# Patient Record
(demographics unavailable — no encounter records)

---

## 2025-04-30 NOTE — ASSESSMENT
[FreeTextEntry1] : We had a thorough discussion regarding her condition and findings.  She is aware that her recent CTA did not in fact reveal an aneurysm but a very small vascular calcification.  Her recent symptoms are felt to be related to malignant hypertension and strict blood pressure control has been recommended.  I have encouraged her to continue to follow-up with her cardiologist.  She should be monitoring her blood pressure daily.  We have also had a thorough discussion regarding lifestyle modifications including but not limited to smoking cessation, dietary changes, weight reduction, and including daily exercise into her weekly routine.  At this point there is no neurosurgical intervention.  She may follow-up with me as needed. Will call barring any issues. All questions answered today.   Kyra Seay MS PA-C Senior Physician Assistant [facility]   Landen Combs MD [facility] Director, Cerebrovascular & Endovascular Neurosurgery [facility]

## 2025-04-30 NOTE — HISTORY OF PRESENT ILLNESS
[de-identified] : Ms. MCINTYRE is a 52-year-old right-handed female who has a past medical history of hypertension and migraine headaches.  On 4/8/25 she developed acute left sided facial numbness, generalized weakness with headaches, dizziness, and muffled sounds. Upon presentation to the ED her BP was 190/125. She has been hospitalized in the past for severe hypertension.  She is under the care of cardiology on antihypertensive medication.  She monitors her blood pressure at home.  Frequently it is high however on this day it was higher than normal.  She also suffers from migraines which she takes Excedrin and Ubrelvy as needed.  During hospitalization this April stroke workup was negative.  MRI of the brain revealed chronic microvascular ischemic changes, findings felt to be related to chronic hypertension/migraines.  Initially the CT angio of the head and neck suggested that there may be a 1 mm saccular aneurysm involving the left cavernous ICA however upon further review of her imaging studies this was felt to be a punctate vascular calcification.  There is no evidence of aneurysm, occlusion, or stenosis.  Today, she reports symptomatic improvement.  She is feeling better.  Her blood pressure today is 128/80.  Since discharge she was seen by her cardiologist.  She has been cognizant and monitoring her blood pressure.   Her neuroexam is intact today.